# Patient Record
Sex: FEMALE | URBAN - METROPOLITAN AREA
[De-identification: names, ages, dates, MRNs, and addresses within clinical notes are randomized per-mention and may not be internally consistent; named-entity substitution may affect disease eponyms.]

---

## 2024-03-13 ENCOUNTER — LAB (OUTPATIENT)
Dept: LAB | Facility: LAB | Age: 69
End: 2024-03-13
Payer: MEDICARE

## 2024-03-13 ENCOUNTER — OFFICE VISIT (OUTPATIENT)
Dept: NEUROLOGY | Facility: CLINIC | Age: 69
End: 2024-03-13
Payer: MEDICARE

## 2024-03-13 VITALS
HEART RATE: 77 BPM | SYSTOLIC BLOOD PRESSURE: 137 MMHG | DIASTOLIC BLOOD PRESSURE: 91 MMHG | HEIGHT: 65 IN | BODY MASS INDEX: 33.32 KG/M2 | WEIGHT: 200 LBS

## 2024-03-13 DIAGNOSIS — E03.9 HYPOTHYROIDISM, UNSPECIFIED TYPE: ICD-10-CM

## 2024-03-13 DIAGNOSIS — G20.C PARKINSONISM, UNSPECIFIED PARKINSONISM TYPE (MULTI): Primary | ICD-10-CM

## 2024-03-13 DIAGNOSIS — G20.C PARKINSONISM, UNSPECIFIED PARKINSONISM TYPE (MULTI): ICD-10-CM

## 2024-03-13 LAB
ALBUMIN SERPL BCP-MCNC: 4.4 G/DL (ref 3.4–5)
ALP SERPL-CCNC: 149 U/L (ref 33–136)
ALT SERPL W P-5'-P-CCNC: 29 U/L (ref 7–45)
ANION GAP SERPL CALC-SCNC: 13 MMOL/L (ref 10–20)
AST SERPL W P-5'-P-CCNC: 28 U/L (ref 9–39)
BILIRUB SERPL-MCNC: 0.5 MG/DL (ref 0–1.2)
BUN SERPL-MCNC: 33 MG/DL (ref 6–23)
CALCIUM SERPL-MCNC: 10.4 MG/DL (ref 8.6–10.6)
CHLORIDE SERPL-SCNC: 102 MMOL/L (ref 98–107)
CO2 SERPL-SCNC: 32 MMOL/L (ref 21–32)
CREAT SERPL-MCNC: 0.91 MG/DL (ref 0.5–1.05)
EGFRCR SERPLBLD CKD-EPI 2021: 69 ML/MIN/1.73M*2
FOLATE SERPL-MCNC: 13.2 NG/ML
GLUCOSE SERPL-MCNC: 88 MG/DL (ref 74–99)
POTASSIUM SERPL-SCNC: 3.7 MMOL/L (ref 3.5–5.3)
PROT SERPL-MCNC: 7.2 G/DL (ref 6.4–8.2)
SODIUM SERPL-SCNC: 143 MMOL/L (ref 136–145)
TSH SERPL-ACNC: 3.46 MIU/L (ref 0.44–3.98)
VIT B12 SERPL-MCNC: 327 PG/ML (ref 211–911)

## 2024-03-13 PROCEDURE — 1126F AMNT PAIN NOTED NONE PRSNT: CPT | Performed by: SPECIALIST

## 2024-03-13 PROCEDURE — 80053 COMPREHEN METABOLIC PANEL: CPT

## 2024-03-13 PROCEDURE — 82607 VITAMIN B-12: CPT

## 2024-03-13 PROCEDURE — 99205 OFFICE O/P NEW HI 60 MIN: CPT | Performed by: SPECIALIST

## 2024-03-13 PROCEDURE — 82652 VIT D 1 25-DIHYDROXY: CPT

## 2024-03-13 PROCEDURE — 84443 ASSAY THYROID STIM HORMONE: CPT

## 2024-03-13 PROCEDURE — 1036F TOBACCO NON-USER: CPT | Performed by: SPECIALIST

## 2024-03-13 PROCEDURE — 82746 ASSAY OF FOLIC ACID SERUM: CPT

## 2024-03-13 PROCEDURE — 36415 COLL VENOUS BLD VENIPUNCTURE: CPT

## 2024-03-13 PROCEDURE — 1159F MED LIST DOCD IN RCRD: CPT | Performed by: SPECIALIST

## 2024-03-13 RX ORDER — PRAVASTATIN SODIUM 40 MG/1
40 TABLET ORAL
COMMUNITY
Start: 2024-02-19

## 2024-03-13 RX ORDER — DILTIAZEM HYDROCHLORIDE 180 MG/1
180 CAPSULE, COATED, EXTENDED RELEASE ORAL DAILY
COMMUNITY

## 2024-03-13 RX ORDER — ESCITALOPRAM OXALATE 10 MG/1
10 TABLET ORAL
COMMUNITY

## 2024-03-13 RX ORDER — BLOOD SUGAR DIAGNOSTIC
STRIP MISCELLANEOUS
COMMUNITY
Start: 2024-03-11

## 2024-03-13 RX ORDER — METFORMIN HYDROCHLORIDE 1000 MG/1
1000 TABLET ORAL
COMMUNITY
Start: 2023-12-20

## 2024-03-13 RX ORDER — SUCRALFATE 1 G/10ML
SUSPENSION ORAL
COMMUNITY
Start: 2023-12-20

## 2024-03-13 RX ORDER — POTASSIUM CHLORIDE 1500 MG/1
TABLET, EXTENDED RELEASE ORAL
COMMUNITY
Start: 2024-02-25

## 2024-03-13 RX ORDER — PANTOPRAZOLE SODIUM 40 MG/1
1 TABLET, DELAYED RELEASE ORAL 2 TIMES DAILY
COMMUNITY
Start: 2023-03-27

## 2024-03-13 RX ORDER — PREDNISONE 5 MG/1
1 TABLET ORAL 2 TIMES DAILY
COMMUNITY
Start: 2017-10-15

## 2024-03-13 RX ORDER — LANCETS 33 GAUGE
EACH MISCELLANEOUS
COMMUNITY
Start: 2024-03-05

## 2024-03-13 RX ORDER — BISOPROLOL FUMARATE 10 MG/1
1 TABLET, FILM COATED ORAL DAILY
COMMUNITY
Start: 2023-03-21

## 2024-03-13 RX ORDER — VIT C/E/ZN/COPPR/LUTEIN/ZEAXAN 250MG-90MG
1000 CAPSULE ORAL
COMMUNITY
Start: 2016-10-13

## 2024-03-13 RX ORDER — FERROUS SULFATE 325(65) MG
1 TABLET ORAL
COMMUNITY
Start: 2023-05-12

## 2024-03-13 RX ORDER — DIGOXIN 125 MCG
TABLET ORAL
COMMUNITY

## 2024-03-13 RX ORDER — ANASTROZOLE 1 MG/1
1 TABLET ORAL
COMMUNITY
Start: 2023-12-14

## 2024-03-13 RX ORDER — METFORMIN HYDROCHLORIDE 500 MG/1
2 TABLET, EXTENDED RELEASE ORAL
COMMUNITY
Start: 2022-12-15

## 2024-03-13 ASSESSMENT — ANXIETY QUESTIONNAIRES
6. BECOMING EASILY ANNOYED OR IRRITABLE: NOT AT ALL
1. FEELING NERVOUS, ANXIOUS, OR ON EDGE: NOT AT ALL
7. FEELING AFRAID AS IF SOMETHING AWFUL MIGHT HAPPEN: NOT AT ALL
IF YOU CHECKED OFF ANY PROBLEMS ON THIS QUESTIONNAIRE, HOW DIFFICULT HAVE THESE PROBLEMS MADE IT FOR YOU TO DO YOUR WORK, TAKE CARE OF THINGS AT HOME, OR GET ALONG WITH OTHER PEOPLE: NOT DIFFICULT AT ALL
5. BEING SO RESTLESS THAT IT IS HARD TO SIT STILL: NOT AT ALL
4. TROUBLE RELAXING: NOT AT ALL
3. WORRYING TOO MUCH ABOUT DIFFERENT THINGS: NOT AT ALL
2. NOT BEING ABLE TO STOP OR CONTROL WORRYING: NOT AT ALL
GAD7 TOTAL SCORE: 0

## 2024-03-13 ASSESSMENT — PATIENT HEALTH QUESTIONNAIRE - PHQ9
2. FEELING DOWN, DEPRESSED OR HOPELESS: NOT AT ALL
1. LITTLE INTEREST OR PLEASURE IN DOING THINGS: NOT AT ALL
SUM OF ALL RESPONSES TO PHQ9 QUESTIONS 1 & 2: 0

## 2024-03-13 ASSESSMENT — LIFESTYLE VARIABLES
HOW OFTEN DO YOU HAVE SIX OR MORE DRINKS ON ONE OCCASION: NEVER
HOW MANY STANDARD DRINKS CONTAINING ALCOHOL DO YOU HAVE ON A TYPICAL DAY: PATIENT DOES NOT DRINK
HOW OFTEN DO YOU HAVE A DRINK CONTAINING ALCOHOL: NEVER
AUDIT-C TOTAL SCORE: 0
SKIP TO QUESTIONS 9-10: 1

## 2024-03-13 ASSESSMENT — ENCOUNTER SYMPTOMS
LOSS OF SENSATION IN FEET: 1
DEPRESSION: 0
OCCASIONAL FEELINGS OF UNSTEADINESS: 1

## 2024-03-13 ASSESSMENT — PAIN SCALES - GENERAL: PAINLEVEL: 0-NO PAIN

## 2024-03-13 NOTE — PROGRESS NOTES
Date of Service: 3/13/2024  Patient: Evelin Cook  MRN: [id number]  Referring Provider: No ref. provider found  Primary Care Physician: No primary care provider on file.     History of Present Illness:      This is a pleasant 68-year-old left-handed lady with a past medical history known for diabetes mellitus type 2 , hypertension, atrial fibrillation, on chronic anticoagulation on Eliquis,  left in situ ductal breast cancer, status postradiation therapy chemotherapy, left lumpectomy, atrial fibrillation , hyperlipidemia, osteoarthritis, peptic ulcer disease,      who was referred to my office by her primary care physician Dr. Ariza for further evaluation of tremor, questionable Parkinson disease.    Evelin has been complaining of involuntary shaking of the hands, predominantly affecting the right hand. This tremor has been present for approximately six months to a year and began following the patient's chemotherapy, radiation, and lumpectomy for breast cancer.     The patient is currently on anastrozole for cancer treatment, which was initiated three months ago. While the patient denies any difficulty with handwriting, She reports experiencing chronic mild imbalance problems since undergoing chemotherapy. Additionally, the patient experiences numbness in the feet, which has been evaluated by Dr. Ariza, who ruled out neuropathy. The patient also describes symptoms consistent with restless leg syndrome at night.       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 She has a history of sleep apnea and uses a sleep apnea machine, although they have not used it since undergoing chemotherapy. Furthermore, the patient has a history of atrial fibrillation, diabetes with a recent A1c of 6.8, and hypertension, for which they are taking multiple medications, including Eliquis.    Reviewing her oncology note revealed:  L Breast IDC  "(cT2,N1,Mx; ER[90%],OR[40%],HER2[3+]; G3): She started Neoadjuvant TCHP on 7/20/22 with plans for 6 cycles. Cycle 5 was given without docetaxel due to hand-foot syndrome. She had surgery on 12/1/22 with final pathology showing residual disease (ypT1,N1). She completed XRT on 2/1/23. She is completed on adjuvant TDM-1 on 12/6/23. She declined adjuvant neratinib. She started adjuvant anastrozole (12/7/23-present)  - she is tolerating Anastrozole with no sig side effects  - f/u in July with screening mammogram, and then yearly unless she decides to start zometa        She was evaluated by Cardiology Dr. Merrick Murguia on 10/26/2023 as she presented with atrial fibrillaiton, AF RVR. His note stated:      \"Seen in 2022 late, put on rate control and eliquis. Seen again with AF rate controlled in in dec 15 for catheter revision. She is DM on glipizide, HTN on norvasc she stopped taking, female, age over 65 chads vasc 4, CVA risk with out anticoagulation 4to 8 % per yearEstablished patient of Dr. Hodge,     She has a history of obstructive sleep apnea and she been off treatment lately      She has a family history suggestive of parkinsonism by her brother .      Prior diagnostic testing:  EKG:  Chest Radiograph: none    Echocardiogram: 11-  1. Normal LV size and mildly impaired LV systolic function, LVEF 50%. There is  significant beat to beat variability due to afib with RVR.  2. Normal RV size and systolic function, also with variability due to afib.  3. Solitary E wave on mitral inflow  4. Mild 1+ TR  5. Mild aortic sclerosis without stenosis. No AI  6. Moderate 2+ MR (increased)  7. Trivial PI  8. No pericardial effusion    Brain MRI 3/9/23 w/wo with no mets     Hypercalcemia: Mild. Bone scan negative for mets. PTH normal (31). Vitamin D and vitamin D 125 are both normal as well. PTH RP was negative (9/2022)    - Last A1c was 6.8    - Calcium level was 10.3 on October 4th, 2023    Elevated Alk Phos: As above, " no evidence of metastatic disease on imaging     Past Medical History:    Diabetes (HCC)  Elevated cholesterol  Essential hypertension, benign  OA (osteoarthritis)  knees/back/right shoulder  Peptic ulcer, unspecified site, unspecified as acute or chronic, without mention of hemorrhage, perforation, or obstruction  PMH - PAST MEDICAL HISTORY OF  colonoscopy 2/12/07 with polyps; stress test negative 10/05      PAST SURGICAL HISTORY  Procedure Laterality Date  COLONOSCOPY SCREENING  EPISIOTOMY OR VAGINAL RPR  TUBAL LIGATION    Cardiac Risk Factors:  Smoking No, HTN Yes, Hyperlipidemia Yes, DM Yes, FHx Yes      FAMILY HISTORY  Problem Relation Age of Onset  Colon Cancer Brother  Colon Cancer Mother 86  Breast Cancer Maternal Aunt  Breast Cancer Maternal Aunt  Prostate Cancer No Family History  Ovarian cancer No Family History  Pancreatic Cancer No Family History  Uterine Cancer No Family History      Social History    Tobacco Use  Smoking status: Never  Smokeless tobacco: Never  Substance Use Topics  Alcohol use: No  Drug use: No      ALLERGIES  Allergen Reactions  Tramadol Other: See Comments  Lightheadedness, nausea      Medications:    Current Outpatient Medications:     anastrozole (Arimidex) 1 mg tablet, Take 1 tablet (1 mg total) by mouth once daily., Disp: , Rfl:     apixaban (Eliquis) 5 mg tablet, Take 1 tablet (5 mg) by mouth twice a day., Disp: , Rfl:     bisoprolol (Zebeta) 10 mg tablet, Take 1 tablet (10 mg) by mouth once daily., Disp: , Rfl:     cholecalciferol (Vitamin D-3) 25 MCG (1000 UT) capsule, Take 1 capsule (25 mcg) by mouth once daily., Disp: , Rfl:     digoxin (Lanoxin) 125 MCG tablet, 1 TABLET ORALLY ONCE ON MON, WED, FRI 90 DAYS, Disp: , Rfl:     dilTIAZem CD (Cardizem CD) 180 mg 24 hr capsule, Take 1 capsule (180 mg) by mouth once daily., Disp: , Rfl:     escitalopram (Lexapro) 10 mg tablet, Take 1 tablet (10 mg) by mouth once daily., Disp: , Rfl:     ferrous sulfate, 325 mg ferrous sulfate,  "tablet, Take 1 tablet by mouth once daily with breakfast., Disp: , Rfl:     metFORMIN (Glucophage) 1,000 mg tablet, Take 1 tablet (1,000 mg) by mouth 2 times a day with meals., Disp: , Rfl:     metFORMIN  mg 24 hr tablet, Take 2 tablets (1,000 mg) by mouth once daily with breakfast., Disp: , Rfl:     OneTouch Delica Plus Lancet 30 gauge misc, , Disp: , Rfl:     OneTouch Ultra Test strip, , Disp: , Rfl:     pantoprazole (ProtoNix) 40 mg EC tablet, Take 1 tablet (40 mg) by mouth 2 times a day., Disp: , Rfl:     potassium chloride CR 20 mEq ER tablet, TAKE 1 CAPSULE BY MOUTH ONCE A DAY FOR 90 DAYS 90 ORALLY ONCE A DAY 90 DAYS, Disp: , Rfl:     pravastatin (Pravachol) 40 mg tablet, Take 1 tablet (40 mg) by mouth once daily., Disp: , Rfl:     predniSONE (Deltasone) 5 mg tablet, Take 1 tablet (5 mg) by mouth 2 times a day., Disp: , Rfl:     sucralfate (Carafate) 100 mg/mL suspension, 10 ML ORALLY TWICE A DAY 90 DAYS, Disp: , Rfl:            Results:     The following labs, imaging, and results were personally reviewed and demonstrated:    Labs:  Lab Results   Component Value Date    HGBA1C 5.5 11/22/2022           Physical Exam:     General Physical Exam:  BP (!) 137/91 (BP Location: Left arm, Patient Position: Sitting, BP Cuff Size: Adult)   Pulse 77   Ht 1.638 m (5' 4.5\")   Wt 90.7 kg (200 lb)   BMI 33.80 kg/m²      She is not in any acute distress.    Musculoskeletal: No scoliosis, lordosis, kyphosis, pes cavus, or hammertoes     Neurological Exam:   Mental status reveals: alert and oriented to person, place, and date. Speech is intact to conversation. Fund of knowledge is normal.     - No jaw tremor observed    - Resting tremor noted in the right hand    - Tremor in the right hand exacerbated by walking  - Left arm cogwheel rigidity, mostly noticed upon activating the right arm  When she performed with findings of slightly slower rapid hand movements on the left side but no significant asymmetry in strength " or coordination    - Gait examination revealed tremor in the right hand when walking     Cranial nerves:  CN 2   Visual fields full to confrontation.   CN 3, 4, 6   Pupils round, 4 mm in diameter, equally reactive to light. Lids symmetric; no ptosis. EOMs normal alignment, full range.   No nystagmus.   CN 5   Facial sensation intact bilaterally.   CN 7   Normal and symmetric facial strength. Nasolabial folds symmetric.   CN 8   Hearing intact to conversation.   CN 9   Palate elevates symmetrically.   CN 11   Normal strength of shoulder shrug and neck turning.   CN 12   Tongue midline, with normal bulk and strength; no fasciculations.      Motor:  Muscle bulk and tone are normal. There are no scapular winging, fasciculations, tremor or other abnormal movement.    Neck flexion and neck extension are normal (MRC 5/5).    MANUAL MUSCLE TESTING IS AS FOLLOWS:    R L      5 5 Shoulder abduction   5 5 Elbow flexion   5 5 Elbow extension   5 5 Wrist flexion   5 5 Wrist extension   5 5 Finger flexion   5 5 Finger extension   5 5 Finger abduction   5 5 Thumb distal flexion   5 5 Thumb abduction     5 5 Hip flexion   5 5 Hip adduction   5 5 Hip abduction   5 5 Knee flexion   5 5 Knee extension   5 5 Ankle dorsiflexion   5 5 Ankle plantarflexion   5 5 Eversion   5 5 Inversion   5 5 Big toe extension   5 5 Toe flexion     Reflexes:   R L    2 2 Biceps    2 2 Brachioradialis    2 2 Triceps    2 2 Patellar   2 2 Achilles     Plantars: toes downgoing to plantar stimulation. No clonus or other pathologic reflexes present.      Sensory:   Pinprick sensation and touch sensation are normal and symmetrical.  Position senses are normal at the toes.  Vibration senses are normal at the toes and ankles.  All sensory modalities were normal in the hands and upper extremities.       Coordination:  Finger-nose-finger is normal without dysmetria or overshoot and heel-to-shin is normal. Rapid alternating movements in hands and feet are normal.      Gait:  Station: Leaning forward while walking. Gait is stable with a normal arm swing and speed. There is no ataxia, shuffling, steppage or waddling gait. Tandem gait is intact. Romberg sign is negative. Right hand tremor while walking with decreased swinging.      Impression/Plan:   This is parkinsonism versus Parkinson disease, mild in degree associated with resting tremor, left-sided cogwheel rigidity with exacerbation in the last 6 to 12 months, history of breast cancer status post surgical repair, radiation therapy and post-chemotherapy TCHP on 7/20/22 for breast cancer, currently on anastrozole, with no evidence of focal CNS pathology on recent Brain MRI (March 9th, 2023).    Concerns include potential chemotherapy-induced Parkinsonism and anastrozole's impact on Parkinson's disease risk, untreated sleep apnea and the family history of parkinsonism by her brother.    - Plan:    - Order CMP,  vitamin B12, folic acid, vitamin D, thyroid function with TSH, repeat calcium levels.    -  Head  CAT scan without contrast     - To maximize her obstructive sleep apnea treatment  To follow up in my office in 3 months or sooner as needed.       Reviewed and approved by ASHLEY REY on 3/13/24 at 9:49 AM.    I personally spent [60 ] minutes on the day of the visit completing the review of the medical record and outside records, obtaining history and performing an appropriate physical exam, patient care, counseling and education, placing orders, independently reviewing results, communicating with the patient/family and other providers, coordinating care and performing appropriate clinical documentation.    Ashley Rey M.D.

## 2024-03-15 LAB — 1,25(OH)2D3 SERPL-MCNC: 45.9 PG/ML (ref 19.9–79.3)

## 2024-03-22 ENCOUNTER — HOSPITAL ENCOUNTER (OUTPATIENT)
Dept: RADIOLOGY | Facility: CLINIC | Age: 69
Discharge: HOME | End: 2024-03-22
Payer: MEDICARE

## 2024-03-22 DIAGNOSIS — G20.C PARKINSONISM, UNSPECIFIED PARKINSONISM TYPE (MULTI): ICD-10-CM

## 2024-03-22 PROCEDURE — 70450 CT HEAD/BRAIN W/O DYE: CPT | Performed by: RADIOLOGY

## 2024-03-22 PROCEDURE — 70450 CT HEAD/BRAIN W/O DYE: CPT
